# Patient Record
Sex: MALE | Race: WHITE | HISPANIC OR LATINO | URBAN - METROPOLITAN AREA
[De-identification: names, ages, dates, MRNs, and addresses within clinical notes are randomized per-mention and may not be internally consistent; named-entity substitution may affect disease eponyms.]

---

## 2022-05-16 ENCOUNTER — APPOINTMENT (OUTPATIENT)
Dept: RADIOLOGY | Age: 32
End: 2022-05-16
Payer: COMMERCIAL

## 2022-05-16 ENCOUNTER — OFFICE VISIT (OUTPATIENT)
Dept: URGENT CARE | Age: 32
End: 2022-05-16
Payer: COMMERCIAL

## 2022-05-16 VITALS
RESPIRATION RATE: 18 BRPM | OXYGEN SATURATION: 99 % | DIASTOLIC BLOOD PRESSURE: 81 MMHG | TEMPERATURE: 98 F | HEART RATE: 63 BPM | SYSTOLIC BLOOD PRESSURE: 121 MMHG

## 2022-05-16 DIAGNOSIS — M25.571 ACUTE RIGHT ANKLE PAIN: ICD-10-CM

## 2022-05-16 DIAGNOSIS — M25.571 ACUTE RIGHT ANKLE PAIN: Primary | ICD-10-CM

## 2022-05-16 PROCEDURE — S9083 URGENT CARE CENTER GLOBAL: HCPCS | Performed by: NURSE PRACTITIONER

## 2022-05-16 PROCEDURE — 73610 X-RAY EXAM OF ANKLE: CPT

## 2022-05-16 PROCEDURE — G0382 LEV 3 HOSP TYPE B ED VISIT: HCPCS | Performed by: NURSE PRACTITIONER

## 2022-05-16 NOTE — PROGRESS NOTES
[facility]        NAME: Chapincito Rodriguez is a 33 y o  male  : 1990    MRN: [id number]  DATE: May 16, 2022  TIME: 10:33 AM    Assessment and Plan   Acute right ankle pain [M25 [id number]]  1  Acute right ankle pain  XR ankle 3+ vw right         Patient Instructions     Take med as directed; aleve  2 tabs aleve BID   Rest, cold compress   Follow up with PCP in 3-5 days  Proceed to  ER if symptoms worsen  Chief Complaint     Chief Complaint   Patient presents with    Foot Pain     Right foot         History of Present Illness       HPI   Reports right ankle pain that started 2 days ago  No trauma  Was working on a car when the pain started  Achy pain  Worse with walking  Did not take any meds for pain since onset  Review of Systems   Review of Systems   Constitutional: Negative for chills and fever  Musculoskeletal: Positive for arthralgias (right ankle) and gait problem (bc of ankle pain)  Skin: Negative for color change and wound  Neurological: Negative for weakness and numbness  Current Medications     No current outpatient medications on file  Current Allergies     Allergies as of 2022    (No Known Allergies)            The following portions of the patient's history were reviewed and updated as appropriate: allergies, current medications, past family history, past medical history, past social history, past surgical history and problem list      History reviewed  No pertinent past medical history  History reviewed  No pertinent surgical history  No family history on file  Medications have been verified  Objective   /81   Pulse 63   Temp 98 °F (36 7 °C)   Resp 18   SpO2 99%   No LMP for male patient  Physical Exam     Physical Exam  Constitutional:       Appearance: He is not ill-appearing or diaphoretic  Musculoskeletal:         General: Tenderness (with palpation of the anterior aspect of the right ankle) present   No swelling or deformity  Skin:     Findings: No bruising or erythema  Neurological:      Gait: Gait abnormal (slight limp, favoring the right foot)

## 2022-05-16 NOTE — LETTER
May 16, 2022     Patient: Barbara Richardson   YOB: 1990   Date of Visit: 5/16/2022       To Whom it May Concern:    Barbara Richardson was seen in my clinic on 5/16/2022  He may return to work on 05/17/2022  If you have any questions or concerns, please don't hesitate to call           Sincerely,          [facility]        CC: No Recipients

## 2023-02-09 ENCOUNTER — RA CDI HCC (OUTPATIENT)
Dept: OTHER | Facility: HOSPITAL | Age: 33
End: 2023-02-09

## 2023-02-09 NOTE — PROGRESS NOTES
[facility]  coding opportunities       Chart reviewed, no opportunity found: CHART REVIEWED, NO OPPORTUNITY FOUND        Patients Insurance        Commercial Insurance: [address] [place] [address]